# Patient Record
Sex: FEMALE | Race: WHITE
[De-identification: names, ages, dates, MRNs, and addresses within clinical notes are randomized per-mention and may not be internally consistent; named-entity substitution may affect disease eponyms.]

---

## 2017-02-02 ENCOUNTER — HOSPITAL ENCOUNTER (OUTPATIENT)
Dept: HOSPITAL 53 - M INFU | Age: 62
Discharge: HOME | End: 2017-02-02
Attending: INTERNAL MEDICINE
Payer: MEDICARE

## 2017-02-02 VITALS — BODY MASS INDEX: 24.91 KG/M2 | WEIGHT: 149.49 LBS | HEIGHT: 65 IN

## 2017-02-02 DIAGNOSIS — Z79.899: ICD-10-CM

## 2017-02-02 DIAGNOSIS — Z94.2: ICD-10-CM

## 2017-02-02 DIAGNOSIS — Z88.1: ICD-10-CM

## 2017-02-02 DIAGNOSIS — Z79.52: ICD-10-CM

## 2017-02-02 DIAGNOSIS — Z88.8: ICD-10-CM

## 2017-02-02 DIAGNOSIS — E27.49: Primary | ICD-10-CM

## 2017-02-02 PROCEDURE — 96372 THER/PROPH/DIAG INJ SC/IM: CPT

## 2017-02-02 PROCEDURE — 82533 TOTAL CORTISOL: CPT

## 2017-02-02 PROCEDURE — 36415 COLL VENOUS BLD VENIPUNCTURE: CPT

## 2019-04-16 ENCOUNTER — HOSPITAL ENCOUNTER (OUTPATIENT)
Dept: HOSPITAL 53 - M RAD | Age: 64
End: 2019-04-16
Attending: INTERNAL MEDICINE
Payer: MEDICARE

## 2019-04-16 DIAGNOSIS — Z48.24: Primary | ICD-10-CM

## 2019-04-16 NOTE — REP
PA and lateral chest:

Comparison is 03/16/2010.

PA and lateral chest:

Comparison is 03/16/2010.

Numerous mediastinal surgical clips and sternotomy wires are again identified,

unchanged.

There is a left paramediastinal surgical staple line.

There is a surgical staple and peripherally in the left lung.

These are unchanged.

There are no focal infiltrates or pleural effusions.

There are no masses or nodules.

I suspect there are bullae in the upper lobes bilaterally.

Cardiac size is normal.  The radha, mediastinum, skeletal structures are otherwise

unremarkable except for chronic grade 1 compression deformity of the approximate

T7 vertebral body, unchanged.

Impression:

There are no new or acute cardiopulmonary findings.

There are chronic stable findings as described.

 

 

Electronically Signed by

Alejandro Dominguez MD 04/16/2019 12:23 P

## 2019-09-06 ENCOUNTER — HOSPITAL ENCOUNTER (OUTPATIENT)
Dept: HOSPITAL 53 - M RAD | Age: 64
End: 2019-09-06
Attending: INTERNAL MEDICINE
Payer: MEDICARE

## 2019-09-06 DIAGNOSIS — Z12.31: Primary | ICD-10-CM

## 2019-09-06 NOTE — REPMRS
Patient History

The patient states she has not had a clinical breast exam in over

a year.

Family history of breast cancer at age 59 in sister, breast 

cancer under age 50 in maternal grandmother.

[facility] @ [facility]

 

Digital Mammo Screening Bilat: September 6, 2019 - Exam #: 

[id number]

Bilateral CC and MLO view(s) were taken.

 

Technologist: Karis Boo Technologist

Prior study comparison: September 3, 2015, bilateral digital 

mammo screening bilat performed at [facility].  

July 28, 2014, bilateral digital mammo screening bilat performed 

at [facility].  July 26, 2013, bilateral digital 

mammo screening bilat performed at [facility].

 

FINDINGS: There are scattered fibroglandular densities.  There is

a moderate amount of residual fibroglandular tissue which is 

fairly symmetric. There is no interval development of dominant 

mass, architectural distortion, or grouped microcalcification 

typical of malignancy. There has been no change in the appearance

of the mammogram from the prior studies.

 

Assessment: BI-RADS/ACR category 1 mammogram. Negative Mammogram.

 

Recommendation

Routine screening mammogram of both breasts in 1 year (for women 

over age 40).

 

This patient's Lifetime Breast Cancer RIsk is estimated at 13.1 

%.

This mammogram was interpreted with the aid of an FDA-approved 

computer-aided dectection system.

 

Electronically Signed By: Jayden Zepeda MD 09/06/19 0846

## 2019-10-07 ENCOUNTER — HOSPITAL ENCOUNTER (EMERGENCY)
Dept: HOSPITAL 53 - M ED | Age: 64
Discharge: HOME | End: 2019-10-07
Payer: MEDICARE

## 2019-10-07 VITALS — HEIGHT: 65 IN | BODY MASS INDEX: 27.14 KG/M2 | WEIGHT: 162.92 LBS

## 2019-10-07 VITALS — DIASTOLIC BLOOD PRESSURE: 80 MMHG | SYSTOLIC BLOOD PRESSURE: 160 MMHG
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
## 2019-10-07 DIAGNOSIS — S61.011A: Primary | ICD-10-CM

## 2019-10-07 DIAGNOSIS — Z88.1: ICD-10-CM
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
## 2019-10-07 DIAGNOSIS — Z79.899: ICD-10-CM

## 2019-10-07 DIAGNOSIS — I10: ICD-10-CM

## 2019-10-15 ENCOUNTER — HOSPITAL ENCOUNTER (OUTPATIENT)
Dept: HOSPITAL 53 - M RAD | Age: 64
End: 2019-10-15
Attending: INTERNAL MEDICINE
Payer: MEDICARE

## 2019-10-15 DIAGNOSIS — Z94.2: Primary | ICD-10-CM

## 2019-10-15 NOTE — REP
Two-view chest:  10/15/2019.

 

Indication:  Preoperative assessment.

 

Comparison:  04/16/2019.

 

Findings:

 

Postoperative sequelae are stable.  There is no evidence of pneumothorax or

significant pleural effusion.  Emphysema sequelae are redemonstrated.  There is

no pulmonary infiltrate.  The cardiac silhouette is within normal limits.

 

Impression:

 

No acute cardiopulmonary process.

 

 

Electronically Signed by

Neil Dominguez DO 10/15/2019 09:05 A

## 2020-03-26 ENCOUNTER — HOSPITAL ENCOUNTER (OUTPATIENT)
Dept: HOSPITAL 53 - M RAD | Age: 65
End: 2020-03-26
Attending: INTERNAL MEDICINE
Payer: MEDICARE

## 2020-03-26 DIAGNOSIS — F91.8: Primary | ICD-10-CM

## 2020-03-26 DIAGNOSIS — Z94.2: ICD-10-CM

## 2020-03-26 NOTE — REP
CHEST, TWO VIEWS:

 

Two views of the chest are performed and compared to prior studies, most recently

10/15/2019.

 

I suspect new reticulonodular opacities scattered throughout the right lung.

There also appears to be an ill-defined opacity slightly greater than 1 cm in

diameter in the left upper lobe.  Heart is normal in size.  Mediastinal

silhouette is unchanged.  There is mild calcification of the thoracic aorta.

Sternal wires are present.  Mediastinal clips are present.  There is mild stable

compression deformity of a mid thoracic vertebral body.

 

IMPRESSION:

 

Scattered reticulonodular infiltrates are suspected in the right lung.  There

also appears to be a focal ill-defined density in the left upper lobe slightly

greater than 1 cm in diameter.  CT of the chest may be performed to further

evaluate.

 

 

Electronically Signed by

Alejandro Ascencio MD 03/26/2020 04:54 P

## 2020-04-10 ENCOUNTER — HOSPITAL ENCOUNTER (OUTPATIENT)
Dept: HOSPITAL 53 - M RAD | Age: 65
End: 2020-04-10
Attending: INTERNAL MEDICINE
Payer: MEDICARE

## 2020-04-10 DIAGNOSIS — R91.8: ICD-10-CM

## 2020-04-10 DIAGNOSIS — Z48.24: ICD-10-CM

## 2020-04-10 DIAGNOSIS — R06.00: Primary | ICD-10-CM

## 2020-04-10 NOTE — REP
REASON FOR EXAM:  Followup plain film finding of scattered opacities.

 

There are no pertinent CT examinations for comparison.  Since the patient's last

CT scan of the chest of 07/09/2008, the patient has undergone lung

transplantation with prior examinations at the [facility] and unavailable

for review at this time.  If and when the priors are made available for

comparison an addendum to this report will be made if necessary.

 

There is no evidence of mediastinal or hilar adenopathy.  There are no pleural or

pericardial effusions.  The imaged upper abdomen is essentially unchanged.  Note

is again made of a right renal cyst. The imaged osseous structures are within

normal limits.

 

Evaluation of the lung fields show scattered asymmetric opacities particularly in

the superior segment of each lower lobe and in the upper lobes.  In the right

upper lobe, there are a few cystic air spaces without air fluid levels.  These

opacities are in no particular fashion and there are no abnormal accompanying

ground-glass opacities.

 

IMPRESSION:

 

1.  Clinical history as described above.

 

2.  Scattered asymmetric opacities as described above.  Etiology uncertain.

Comparison to priors would be helpful.  Certainly, infectious versus other

etiologies.

 

3.  Likely right upper lobe chronic changes.

 

4.  Other findings as described above.

 

 

Electronically Signed by

Rashard Alcaraz DO 04/10/2020 09:16 A

## 2020-08-10 ENCOUNTER — HOSPITAL ENCOUNTER (OUTPATIENT)
Dept: HOSPITAL 53 - M RAD | Age: 65
End: 2020-08-10
Attending: INTERNAL MEDICINE
Payer: MEDICARE

## 2020-08-10 DIAGNOSIS — Z94.2: Primary | ICD-10-CM

## 2020-09-25 NOTE — REP
CT OF THE CHEST WITHOUT IV CONTRAST:



HISTORY:  Lung transplant.



COMPARISON: 4/10/20



Dictation is delayed due to facility computer malfunctions. 



TECHNIQUE: CT chest performed without IV contrast. 



FINDINGS:

Once again, there are patchy parenchymal opacities bilaterally in the upper 
lobes and superior aspect of lower lobes. These have mildly increased in extent 
when compared to the prior study. 



There is no evidence of axillary or mediastinal adenopathy. There is mild 
scattered atherosclerotic calcification of the thoracic aortic without aneurysm.
The heart is normal in size. There are no pleural or pericardial effusions. 
There is a small hiatal hernia. A right renal cyst is again noted, unchanged. 
There is a mild compression deformity of a mid-thoracic vertebral body, which is
stable. There are mild degenerative changes in the spine.



IMPRESSION: 

Previously noted patchy parenchymal opacities bilaterally in the lungs have 
mildly increased compared to the prior study of 4/10/20. Otherwise the 
examination is stable. 

MTDD

## 2020-11-04 ENCOUNTER — HOSPITAL ENCOUNTER (OUTPATIENT)
Dept: HOSPITAL 53 - M WHC | Age: 65
End: 2020-11-04
Attending: OBSTETRICS & GYNECOLOGY
Payer: MEDICARE

## 2020-11-04 DIAGNOSIS — Z13.820: ICD-10-CM

## 2020-11-04 DIAGNOSIS — Z12.31: Primary | ICD-10-CM

## 2020-11-04 DIAGNOSIS — M81.0: ICD-10-CM

## 2020-11-04 NOTE — DEXA
INDICATION:

Z13.820 SCREENING  FOR OSTEOPOROSIS.



COMPARISON:

05/28/2014 and 03/23/2007.



TECHNIQUE:

Bone density was measured using dual-energy x-ray absorptiometry (DEXA).



FINDINGS:

AP SPINE L1-L4

BMD 1.058 g/cm2

Young Adult T-Score -1.1

Age Matched Z-Score 0.5.



LT FEMUR, TOTAL

BMD 0.787 g/cm2

Young Adult T-Score -1.7

Age Matched Z-Score -0.4.



LT NECK

BMD 0.830 g/cm2

Young Adult T-Score -1.5

Age Matched Z-Score 0.1.



RT FEMUR, TOTAL

BMD 0.747 g/cm2

Young Adult T-Score -2.1

Age Matched Z-Score -0.7.



RT NECK

BMD 0.752 g/cm2

Young Adult T-Score -2.1

Age Matched Z-Score -0.5.



IMPRESSION:

There is low bone density of the spine.



There is low bone density of the left hip.





There is low bone density of the right hip.



The density of the spine has decreased 1.7% since the initial exam on 03/23/2007.





The density of the spine decreased 12.0% since most recent exam on 05/28/2014.





The density of the left hip has increased 8.6% since initial exam on 03/23/2007.





The density of the left hip has decreased 2.0% since most recent exam on 05/28/2014.





The density of the right hip has increased 3.9% since the initial exam on 03/23/2007.





The density of the right hip has decreased 2.2% since the most recent exam on

05/28/2014.



FOLLOW-UP:

Recommendation for the next bone density exam: 2 years.





<Electronically signed by Alejandro Ascencio > 11/04/20 [id number]

## 2020-11-04 NOTE — REPMRS
Patient History

The patient states she had a clinical breast exam in Oct. 2020.

 

Family history of breast cancer at age 59 in sister, breast 

cancer under age 50 in maternal grandmother.

 

3D TOMOSYNTHESIS WAS PERFORMED.

 

[facility].4%.

 

Volpara breast density c.

 

Digital Woman Screen Mammo: November 4, 2020 - Exam #: 

[id number]

Bilateral CC and MLO view(s) were taken.

 

Technologist: Vidya Bansal, Technologist

Prior study comparison: September 6, 2019, bilateral digital 

mammo screening bilat, performed at [facility].  

September 3, 2015, bilateral digital mammo screening bilat, 

performed at [facility].

 

FINDINGS: There are scattered fibroglandular densities.  There 

has been no change in the appearance of the mammogram from the 

prior studies.  There is a mild amount of residual fibroglandular

tissue which is fairly symmetric. There is no interval 

development of dominant mass, architectural distortion, or 

clustered microcalcification suggestive of malignancy.

 

Assessment: BI-RADS/ACR category 1 mammogram. Negative Mammogram.

 

Recommendation

Routine screening mammogram in 1 year (for women over age 40).

This mammogram was interpreted with the aid of an FDA-approved 

computer-aided dectection system.

 

Electronically Signed By: Alejandro Ascencio MD 11/04/20 2687

## 2020-12-08 ENCOUNTER — HOSPITAL ENCOUNTER (OUTPATIENT)
Dept: HOSPITAL 53 - M WUC | Age: 65
End: 2020-12-08
Attending: PHYSICIAN ASSISTANT
Payer: MEDICARE

## 2020-12-08 DIAGNOSIS — M79.672: Primary | ICD-10-CM

## 2020-12-08 NOTE — REP
INDICATION:

PAIN LEFT FOOT



COMPARISON:

None.



FINDINGS:

There is no fracture or dislocation.

Mineralization and joint spaces are normal.

There are no calcifications or foreign bodies.



IMPRESSION:

Negative left foot.





<Electronically signed by Alejandro Dominguez > 12/08/20 [id number]

## 2021-01-06 ENCOUNTER — HOSPITAL ENCOUNTER (OUTPATIENT)
Dept: HOSPITAL 53 - M RAD | Age: 66
End: 2021-01-06
Attending: INTERNAL MEDICINE
Payer: MEDICARE

## 2021-01-06 DIAGNOSIS — Z79.899: ICD-10-CM

## 2021-01-06 DIAGNOSIS — Z94.2: ICD-10-CM

## 2021-01-06 DIAGNOSIS — R91.8: Primary | ICD-10-CM

## 2021-01-06 NOTE — REP
INDICATION:

LUNG TRANSPLANT



COMPARISON:

None



TECHNIQUE:

Axial noncontrast images from the thoracic inlet to the upper abdomen with coronal and

sagittal reformations.



This CT examination was performed using the following dose reduction techniques:

Automated exposure control, adjustment of mA and/or kv according to the patient's

size, and use of iterative reconstruction technique.



FINDINGS:

The bilateral lung fields are well aerated and again demonstrate scattered bilateral

patchy parenchymal opacities relatively unchanged as compared with 08/10/2020

examination.  No pleural effusion.  No pneumothorax.  Tracheobronchial tree is patent.

No obvious adenopathy although evaluation is limited by the lack of contrast

enhancement.



Further evaluation of the mediastinum demonstrates relatively stable atherosclerotic

changes to the thoracic aorta without evidence for aneurysm.  Heart and pericardium

appear normal.  Surrounding musculoskeletal structures are intact.



Limited upper abdomen demonstrates normal bilateral adrenal glands along with 4.9 cm

upper pole right renal cyst and nonobstructing right renal calculi up to 2 mm.



IMPRESSION:

Patchy bilateral parenchymal opacities primarily involving the upper lung zones and

relatively unchanged as compared with 08/10/2020 examination.  No new significant

process identified.





<Electronically signed by Ruben Wells > 01/06/21 1001

## 2021-03-16 ENCOUNTER — HOSPITAL ENCOUNTER (OUTPATIENT)
Dept: HOSPITAL 53 - M RAD | Age: 66
End: 2021-03-16
Attending: INTERNAL MEDICINE
Payer: MEDICARE

## 2021-03-16 DIAGNOSIS — Z94.2: ICD-10-CM

## 2021-03-16 DIAGNOSIS — N28.1: ICD-10-CM

## 2021-03-16 DIAGNOSIS — R93.89: Primary | ICD-10-CM

## 2021-03-16 NOTE — REP
INDICATION:

LUNG TRANSPLANT STATUS.



COMPARISON:

Chest CT without IV contrast dated 04/10/2020, 08/10/2020 and 01/06/2021.



TECHNIQUE:

Chest CT without IV contrast.



FINDINGS:

There are multifocal areas of patchy interstitial thickening accompanied by

ground-glass densities scattered throughout the lung fields bilaterally, not

significantly changed from the comparison studies.  The persistence of this finding

suggests these may be multifocal areas of parenchymal scarring.  No acute infiltrates

are identified.  There are no pleural effusions.

There are mediastinal surgical clips, unchanged.  There is no mediastinal or axillary

lymph node enlargement.  In the absence of IV contrast the study is insensitive for

hilar lymph node enlargement.

Thoracic aorta is unremarkable except for occasional calcified atheroma.  Cardiac size

is normal.  There is no pericardial effusion.

Upper abdomen:



There is a renal cyst at the upper pole of the right kidney measuring up to 4.6 cm,

not significantly changed.  The adrenals are unremarkable.  The unenhanced hepatic

parenchyma, pancreas and spleen are unremarkable.  The gallbladder is not included in

the scanning field.



IMPRESSION:

Multifocal areas of patchy interstitial thickening accompanied by ground-glass

densities throughout both lung fields, not significantly changed, likely multifocal

areas of parenchymal scarring.  There are no new infiltrates or pleural effusions.

There is no adenopathy.

Right renal cyst, unchanged.





<Electronically signed by Alejandro Doimnguez > 03/16/21 1696

## 2021-04-02 ENCOUNTER — HOSPITAL ENCOUNTER (OUTPATIENT)
Dept: HOSPITAL 53 - M SFHCDERM | Age: 66
End: 2021-04-02
Attending: DERMATOLOGY
Payer: MEDICARE

## 2021-04-02 DIAGNOSIS — L57.0: Primary | ICD-10-CM

## 2021-06-18 ENCOUNTER — HOSPITAL ENCOUNTER (OUTPATIENT)
Dept: HOSPITAL 53 - M LAB REF | Age: 66
End: 2021-06-18
Attending: DERMATOLOGY
Payer: MEDICARE

## 2021-06-18 DIAGNOSIS — D04.39: Primary | ICD-10-CM

## 2021-06-18 DIAGNOSIS — L82.1: ICD-10-CM

## 2021-06-18 DIAGNOSIS — C44.622: ICD-10-CM

## 2021-06-18 PROCEDURE — 88305 TISSUE EXAM BY PATHOLOGIST: CPT

## 2021-06-18 PROCEDURE — 11103 TANGNTL BX SKIN EA SEP/ADDL: CPT

## 2021-06-18 PROCEDURE — 11102 TANGNTL BX SKIN SINGLE LES: CPT

## 2021-08-23 ENCOUNTER — HOSPITAL ENCOUNTER (OUTPATIENT)
Dept: HOSPITAL 53 - M RAD | Age: 66
End: 2021-08-23
Attending: INTERNAL MEDICINE
Payer: MEDICARE

## 2021-08-23 DIAGNOSIS — N28.1: ICD-10-CM

## 2021-08-23 DIAGNOSIS — Z94.2: Primary | ICD-10-CM

## 2021-08-23 DIAGNOSIS — N20.0: ICD-10-CM

## 2021-08-23 NOTE — REP
INDICATION:

LUNG TRANSPLANT STATUS



COMPARISON:

Multiple prior examinations dating through 08/10/2020



TECHNIQUE:

Axial noncontrast images from the thoracic inlet to the upper abdomen with coronal and

sagittal reformations.



This CT examination was performed using the following dose reduction techniques:

Automated exposure control, adjustment of mA and/or kv according to the patient's

size, and use of iterative reconstruction technique.



FINDINGS:

Bilateral patchy parenchymal opacities primarily involving the bilateral upper lobes

and apical lower lobes along with elements of scarring and postsurgical changes remain

essentially stable.  No new acute parenchymal or pleural process identified.  No

effusion.  No pneumothorax.



Tracheobronchial tree is patent and relatively normal/stable.  Postsurgical changes in

the mediastinum noted.  Stable nonspecific lymph nodes measuring up to 10 mm.

Thoracic aorta demonstrates atherosclerotic changes without aneurysm.  No cardiomegaly

or pericardial effusion.



Limited upper abdomen demonstrates normal bilateral adrenal glands, stable 4.5 cm

right renal cyst and few nonobstructing right renal calculi up to 3 mm.



IMPRESSION:

Bilateral patchy parenchymal opacities with postsurgical changes and scarring

relatively stable through 08/10/2020.  No new acute mediastinal or pleuroparenchymal

process appreciated.





<Electronically signed by Ruben Wells > 08/23/21 0993

## 2021-09-17 ENCOUNTER — HOSPITAL ENCOUNTER (OUTPATIENT)
Dept: HOSPITAL 53 - M LAB REF | Age: 66
End: 2021-09-17
Attending: DERMATOLOGY
Payer: MEDICARE

## 2021-09-17 DIAGNOSIS — L57.0: ICD-10-CM

## 2021-09-17 DIAGNOSIS — C44.622: ICD-10-CM

## 2021-09-17 DIAGNOSIS — C44.629: Primary | ICD-10-CM

## 2021-09-17 PROCEDURE — 12032 INTMD RPR S/A/T/EXT 2.6-7.5: CPT

## 2021-09-17 PROCEDURE — 88305 TISSUE EXAM BY PATHOLOGIST: CPT

## 2021-09-17 PROCEDURE — 11102 TANGNTL BX SKIN SINGLE LES: CPT

## 2021-09-17 PROCEDURE — 11602 EXC TR-EXT MAL+MARG 1.1-2 CM: CPT

## 2021-12-08 ENCOUNTER — HOSPITAL ENCOUNTER (OUTPATIENT)
Dept: HOSPITAL 53 - M WHC | Age: 66
End: 2021-12-08
Attending: OBSTETRICS & GYNECOLOGY
Payer: MEDICARE

## 2021-12-08 DIAGNOSIS — Z12.31: Primary | ICD-10-CM

## 2021-12-08 NOTE — REP
INDICATION:

SCREENING MAMMO.



COMPARISON:

Multiple prior screening examinations, the most recent, 11/04/2020.



TECHNIQUE:

Digital screening (2D) mammography was performed bilaterally in the CC and MLO

projections. Additionally, breast tomosynthesis (3D mammography) was performed

bilaterally in the CC and MLO projections.



FINDINGS:

By history, the patient has no complaints of a palpable breast abnormality or other

significant breast complaints.

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

in 2009.



The Volpara volumetric breast density pattern is b, there are scattered areas of

fibroglandular density.



There are no suspicious calcifications, dominant masses, or developing asymmetries in

either breast.  There is no significant change since the prior exam.  There is stable

nipple inversion bilaterally.



IMPRESSION:

BIRADS/ACR : Category 2: Benign finding.



This patient's Tyrer-Cuzick lifetime breast cancer risk assessment score is 11.7%.



This mammogram was interpreted with the aid of an FDA-approved computer-aided

detection system.



The patient states she had a clinical breast exam in October 2021.



The patient letter being requested is M2.



RECOMMENDATION:

Repeat screening mammography recommended 1 year (for women over 40).





<Electronically signed by Fabian Adams > 12/08/21 1555

## 2022-01-07 ENCOUNTER — HOSPITAL ENCOUNTER (OUTPATIENT)
Dept: HOSPITAL 53 - M LAB REF | Age: 67
End: 2022-01-07
Attending: DERMATOLOGY
Payer: MEDICARE

## 2022-01-07 DIAGNOSIS — C44.629: Primary | ICD-10-CM

## 2022-01-07 DIAGNOSIS — L90.5: ICD-10-CM

## 2022-01-07 PROCEDURE — 88305 TISSUE EXAM BY PATHOLOGIST: CPT

## 2022-01-07 PROCEDURE — 12032 INTMD RPR S/A/T/EXT 2.6-7.5: CPT

## 2022-01-07 PROCEDURE — 11603 EXC TR-EXT MAL+MARG 2.1-3 CM: CPT

## 2022-01-14 ENCOUNTER — HOSPITAL ENCOUNTER (OUTPATIENT)
Dept: HOSPITAL 53 - M LAB REF | Age: 67
End: 2022-01-14
Attending: DERMATOLOGY
Payer: MEDICARE

## 2022-01-14 DIAGNOSIS — L57.0: ICD-10-CM

## 2022-01-14 DIAGNOSIS — D04.39: Primary | ICD-10-CM

## 2022-01-14 PROCEDURE — 88305 TISSUE EXAM BY PATHOLOGIST: CPT

## 2022-01-14 PROCEDURE — 11103 TANGNTL BX SKIN EA SEP/ADDL: CPT

## 2022-01-14 PROCEDURE — 11102 TANGNTL BX SKIN SINGLE LES: CPT

## 2022-02-16 ENCOUNTER — HOSPITAL ENCOUNTER (OUTPATIENT)
Dept: HOSPITAL 53 - M RAD | Age: 67
End: 2022-02-16
Attending: INTERNAL MEDICINE
Payer: MEDICARE

## 2022-02-16 DIAGNOSIS — Z94.2: Primary | ICD-10-CM

## 2022-03-24 ENCOUNTER — HOSPITAL ENCOUNTER (OUTPATIENT)
Dept: HOSPITAL 53 - M SFHCDERM | Age: 67
End: 2022-03-24
Attending: PHYSICIAN ASSISTANT
Payer: MEDICARE

## 2022-03-24 DIAGNOSIS — C44.40: Primary | ICD-10-CM

## 2022-04-11 ENCOUNTER — HOSPITAL ENCOUNTER (OUTPATIENT)
Dept: HOSPITAL 53 - M PLARAD | Age: 67
End: 2022-04-11
Attending: PHYSICIAN ASSISTANT
Payer: MEDICARE

## 2022-04-11 DIAGNOSIS — C80.1: ICD-10-CM

## 2022-04-11 DIAGNOSIS — I70.0: ICD-10-CM

## 2022-04-11 DIAGNOSIS — Z94.2: ICD-10-CM

## 2022-04-11 DIAGNOSIS — K44.9: ICD-10-CM

## 2022-04-11 DIAGNOSIS — N28.89: ICD-10-CM

## 2022-04-11 DIAGNOSIS — R91.8: ICD-10-CM

## 2022-04-11 DIAGNOSIS — D84.9: ICD-10-CM

## 2022-04-11 DIAGNOSIS — N28.1: ICD-10-CM

## 2022-04-11 DIAGNOSIS — I51.7: ICD-10-CM

## 2022-04-11 DIAGNOSIS — C79.2: Primary | ICD-10-CM

## 2022-04-11 DIAGNOSIS — K57.90: ICD-10-CM

## 2022-04-11 PROCEDURE — 78816 PET IMAGE W/CT FULL BODY: CPT

## 2022-06-16 ENCOUNTER — HOSPITAL ENCOUNTER (OUTPATIENT)
Dept: HOSPITAL 53 - M ONCR | Age: 67
End: 2022-06-16
Attending: RADIOLOGY
Payer: MEDICARE

## 2022-06-16 DIAGNOSIS — Z88.8: ICD-10-CM

## 2022-06-16 DIAGNOSIS — Z88.1: ICD-10-CM

## 2022-06-16 DIAGNOSIS — Z85.828: ICD-10-CM

## 2022-06-16 DIAGNOSIS — Z80.8: ICD-10-CM

## 2022-06-16 DIAGNOSIS — C44.42: Primary | ICD-10-CM

## 2022-06-16 DIAGNOSIS — Z92.25: ICD-10-CM

## 2022-06-16 DIAGNOSIS — Z94.2: ICD-10-CM

## 2022-06-16 DIAGNOSIS — Z79.899: ICD-10-CM

## 2022-06-29 ENCOUNTER — HOSPITAL ENCOUNTER (OUTPATIENT)
Dept: HOSPITAL 53 - M ONCR | Age: 67
LOS: 1 days | End: 2022-06-30
Attending: RADIOLOGY
Payer: MEDICARE

## 2022-06-29 DIAGNOSIS — Z79.52: ICD-10-CM

## 2022-06-29 DIAGNOSIS — Z94.2: ICD-10-CM

## 2022-06-29 DIAGNOSIS — Z79.899: ICD-10-CM

## 2022-06-29 DIAGNOSIS — C44.42: Primary | ICD-10-CM

## 2022-06-29 DIAGNOSIS — Z88.1: ICD-10-CM

## 2022-07-08 ENCOUNTER — HOSPITAL ENCOUNTER (OUTPATIENT)
Dept: HOSPITAL 53 - M SFHCDERM | Age: 67
End: 2022-07-08
Attending: DERMATOLOGY
Payer: MEDICARE

## 2022-07-08 DIAGNOSIS — L57.0: Primary | ICD-10-CM

## 2022-07-29 ENCOUNTER — HOSPITAL ENCOUNTER (OUTPATIENT)
Dept: HOSPITAL 53 - M ONCR | Age: 67
LOS: 2 days | End: 2022-07-31
Attending: RADIOLOGY
Payer: MEDICARE

## 2022-07-29 DIAGNOSIS — C44.42: Primary | ICD-10-CM

## 2022-08-15 ENCOUNTER — HOSPITAL ENCOUNTER (OUTPATIENT)
Dept: HOSPITAL 53 - M ONCR | Age: 67
LOS: 16 days | End: 2022-08-31
Attending: RADIOLOGY
Payer: MEDICARE

## 2022-08-15 DIAGNOSIS — C44.42: Primary | ICD-10-CM

## 2022-08-31 ENCOUNTER — HOSPITAL ENCOUNTER (OUTPATIENT)
Dept: HOSPITAL 53 - M RAD | Age: 67
End: 2022-08-31
Attending: INTERNAL MEDICINE
Payer: MEDICARE

## 2022-08-31 DIAGNOSIS — Z94.2: ICD-10-CM

## 2022-08-31 DIAGNOSIS — R91.8: Primary | ICD-10-CM

## 2022-09-16 ENCOUNTER — HOSPITAL ENCOUNTER (OUTPATIENT)
Dept: HOSPITAL 53 - M ONCR | Age: 67
End: 2022-09-16
Attending: RADIOLOGY
Payer: MEDICARE

## 2022-09-16 ENCOUNTER — HOSPITAL ENCOUNTER (OUTPATIENT)
Dept: HOSPITAL 53 - M SFHCDERM | Age: 67
End: 2022-09-16
Attending: DERMATOLOGY
Payer: MEDICARE

## 2022-09-16 DIAGNOSIS — Z92.3: ICD-10-CM

## 2022-09-16 DIAGNOSIS — C44.42: Primary | ICD-10-CM

## 2022-09-16 DIAGNOSIS — L59.8: ICD-10-CM

## 2022-10-24 ENCOUNTER — HOSPITAL ENCOUNTER (OUTPATIENT)
Dept: HOSPITAL 53 - M PLARAD | Age: 67
End: 2022-10-24
Attending: DERMATOLOGY
Payer: MEDICARE

## 2022-10-24 DIAGNOSIS — D84.9: ICD-10-CM

## 2022-10-24 DIAGNOSIS — C44.329: Primary | ICD-10-CM

## 2022-10-24 PROCEDURE — 78816 PET IMAGE W/CT FULL BODY: CPT

## 2022-12-09 ENCOUNTER — HOSPITAL ENCOUNTER (OUTPATIENT)
Dept: HOSPITAL 53 - M WHC | Age: 67
End: 2022-12-09
Attending: OBSTETRICS & GYNECOLOGY
Payer: MEDICARE

## 2022-12-09 DIAGNOSIS — Z12.31: Primary | ICD-10-CM

## 2022-12-09 DIAGNOSIS — M85.851: ICD-10-CM

## 2022-12-09 DIAGNOSIS — M85.852: ICD-10-CM

## 2022-12-14 ENCOUNTER — HOSPITAL ENCOUNTER (OUTPATIENT)
Dept: HOSPITAL 53 - M SFHCDERM | Age: 67
End: 2022-12-14
Attending: DERMATOLOGY
Payer: MEDICARE

## 2022-12-14 DIAGNOSIS — Z48.02: Primary | ICD-10-CM

## 2023-03-01 ENCOUNTER — HOSPITAL ENCOUNTER (OUTPATIENT)
Dept: HOSPITAL 53 - M RAD | Age: 68
End: 2023-03-01
Attending: INTERNAL MEDICINE
Payer: MEDICARE

## 2023-03-01 DIAGNOSIS — Z94.2: Primary | ICD-10-CM

## 2023-03-01 DIAGNOSIS — J84.10: ICD-10-CM

## 2023-03-22 ENCOUNTER — HOSPITAL ENCOUNTER (OUTPATIENT)
Dept: HOSPITAL 53 - M ONCR | Age: 68
End: 2023-03-22
Attending: RADIOLOGY
Payer: MEDICARE

## 2023-03-22 DIAGNOSIS — L57.0: ICD-10-CM

## 2023-03-22 DIAGNOSIS — Z94.2: ICD-10-CM

## 2023-03-22 DIAGNOSIS — C44.42: Primary | ICD-10-CM

## 2023-03-22 DIAGNOSIS — Z88.1: ICD-10-CM

## 2023-03-22 DIAGNOSIS — Z92.3: ICD-10-CM

## 2023-03-29 ENCOUNTER — HOSPITAL ENCOUNTER (OUTPATIENT)
Dept: HOSPITAL 53 - M SFHCDERM | Age: 68
End: 2023-03-29
Attending: DERMATOLOGY
Payer: MEDICARE

## 2023-03-29 DIAGNOSIS — D04.61: Primary | ICD-10-CM

## 2023-03-29 DIAGNOSIS — C44.622: ICD-10-CM

## 2023-09-11 ENCOUNTER — HOSPITAL ENCOUNTER (OUTPATIENT)
Dept: HOSPITAL 53 - M RAD | Age: 68
End: 2023-09-11
Attending: INTERNAL MEDICINE
Payer: MEDICARE

## 2023-09-11 DIAGNOSIS — Z94.2: ICD-10-CM

## 2023-09-11 DIAGNOSIS — R91.8: Primary | ICD-10-CM

## 2023-10-27 ENCOUNTER — HOSPITAL ENCOUNTER (OUTPATIENT)
Dept: HOSPITAL 53 - M SFHCDERM | Age: 68
End: 2023-10-27
Attending: DERMATOLOGY
Payer: MEDICARE

## 2023-10-27 DIAGNOSIS — D04.61: Primary | ICD-10-CM

## 2023-12-11 ENCOUNTER — HOSPITAL ENCOUNTER (OUTPATIENT)
Dept: HOSPITAL 53 - M WHC | Age: 68
End: 2023-12-11
Attending: OBSTETRICS & GYNECOLOGY
Payer: MEDICARE

## 2023-12-11 DIAGNOSIS — Z12.31: Primary | ICD-10-CM
